# Patient Record
Sex: FEMALE | Race: WHITE | NOT HISPANIC OR LATINO | Employment: FULL TIME | URBAN - METROPOLITAN AREA
[De-identification: names, ages, dates, MRNs, and addresses within clinical notes are randomized per-mention and may not be internally consistent; named-entity substitution may affect disease eponyms.]

---

## 2020-12-16 ENCOUNTER — APPOINTMENT (OUTPATIENT)
Dept: CT IMAGING | Facility: CLINIC | Age: 30
End: 2020-12-16
Attending: EMERGENCY MEDICINE
Payer: OTHER MISCELLANEOUS

## 2020-12-16 ENCOUNTER — HOSPITAL ENCOUNTER (EMERGENCY)
Facility: CLINIC | Age: 30
Discharge: HOME | End: 2020-12-16
Attending: EMERGENCY MEDICINE | Admitting: EMERGENCY MEDICINE
Payer: OTHER MISCELLANEOUS

## 2020-12-16 VITALS
DIASTOLIC BLOOD PRESSURE: 86 MMHG | HEART RATE: 84 BPM | SYSTOLIC BLOOD PRESSURE: 130 MMHG | OXYGEN SATURATION: 100 % | TEMPERATURE: 98.4 F | RESPIRATION RATE: 16 BRPM

## 2020-12-16 DIAGNOSIS — S00.83XA CONTUSION OF FACE, INITIAL ENCOUNTER: ICD-10-CM

## 2020-12-16 DIAGNOSIS — S06.0X0A CONCUSSION WITHOUT LOSS OF CONSCIOUSNESS, INITIAL ENCOUNTER: ICD-10-CM
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
## 2020-12-16 LAB
HCG UR QL: NEGATIVE
INTERNAL QC OK POCT: YES

## 2020-12-16 PROCEDURE — 99284 EMERGENCY DEPT VISIT MOD MDM: CPT | Performed by: EMERGENCY MEDICINE

## 2020-12-16 PROCEDURE — 81025 URINE PREGNANCY TEST: CPT | Performed by: EMERGENCY MEDICINE

## 2020-12-16 PROCEDURE — 70486 CT MAXILLOFACIAL W/O DYE: CPT

## 2020-12-16 PROCEDURE — 70450 CT HEAD/BRAIN W/O DYE: CPT

## 2020-12-16 PROCEDURE — 99285 EMERGENCY DEPT VISIT HI MDM: CPT | Mod: 25 | Performed by: EMERGENCY MEDICINE

## 2020-12-16 NOTE — ED NOTES
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

## 2020-12-16 NOTE — ED TRIAGE NOTES
Pt was working with a horse yesterday and was kicked in the head. Pt states she was grazed on the left side by the eye. Pt states she has been feeling foggy, harder making decisions, and more emotional today.

## 2020-12-16 NOTE — ED PROVIDER NOTES
"ED Provider Note  [facility]      History     Chief Complaint   Patient presents with     Head Injury     Pt was kicked near left eye by horse     The history is provided by the patient.     Audra Villa is a 30 year old otherwise healthy female who presents to the Emergency Department for evaluation following being kicked by a horse. Patient reports yesterday around 1745 she was trying to help her employer who had been trapped under a horse that fell down. She states at this time the horse kicked her just lateral to her left eye. Patient reports she initially felt fine, after about 30 minutes she began to develop pain but it was tolerable with ibuprofen. Patient reports today she went to work and felt \"off\" overall. She states her pain increased from 3/10 to 5/10. The pain begins lateral to her left eye and radiates into the left occipital area. She states she also felt more emotional and was crying easily. Patient is also more fatigued. Patient denies loss of consciousness. No change in vision or dizziness. No other symptoms noted.    Past Medical History  History reviewed. No pertinent past medical history.  History reviewed. No pertinent surgical history.  No current outpatient medications on file.    No Known Allergies  Family History  History reviewed. No pertinent family history.  Social History   Social History     Tobacco Use     Smoking status: Never Smoker     Smokeless tobacco: Never Used   Substance Use Topics     Alcohol use: Not Currently     Drug use: Never      Past medical history, past surgical history, medications, allergies, family history, and social history were reviewed with the patient. No additional pertinent items.       Review of Systems  A complete review of systems was performed with pertinent positives and negatives noted in the HPI, and all other systems negative.    Physical Exam   BP: (!) 153/86  Pulse: 111  Temp: 98.4  F (36.9  C)  Resp: 16  SpO2: 97 " %  Physical Exam  Constitutional:       General: She is not in acute distress.     Appearance: She is well-developed. She is not diaphoretic.   HENT:      Head: Normocephalic. Abrasion present. No raccoon eyes, Cordova's sign, right periorbital erythema or left periorbital erythema.      Jaw: No malocclusion.        Mouth/Throat:      Pharynx: No oropharyngeal exudate.   Eyes:      General: No scleral icterus.        Right eye: No discharge.         Left eye: No discharge.      Extraocular Movements: Extraocular movements intact.      Conjunctiva/sclera: Conjunctivae normal.      Pupils: Pupils are equal, round, and reactive to light.   Neck:      Musculoskeletal: Normal range of motion and neck supple. No spinous process tenderness or muscular tenderness.   Cardiovascular:      Rate and Rhythm: Normal rate and regular rhythm.      Heart sounds: Normal heart sounds. No murmur. No friction rub. No gallop.    Pulmonary:      Effort: Pulmonary effort is normal. No respiratory distress.      Breath sounds: Normal breath sounds. No wheezing.   Chest:      Chest wall: No tenderness.   Abdominal:      General: Bowel sounds are normal. There is no distension.      Palpations: Abdomen is soft.      Tenderness: There is no abdominal tenderness.   Musculoskeletal: Normal range of motion.         General: No tenderness or deformity.   Skin:     General: Skin is warm and dry.      Coloration: Skin is not pale.      Findings: No erythema or rash.   Neurological:      General: No focal deficit present.      Mental Status: She is alert and oriented to person, place, and time.      Cranial Nerves: No cranial nerve deficit.      Sensory: No sensory deficit.      Motor: No weakness.      Coordination: Coordination normal.         ED Course      Procedures                         No results found for any visits on 12/16/20.  Medications - No data to display     Assessments & Plan (with Medical Decision Making)   This is a 30-year-old  female who presents after being kicked by a horse.  This occurred yesterday.  There is no LOC.  Patient has been having worsening headache since this time.  She also notes that she is not feeling herself and is more emotional than usual.  On exam patient has an abrasion to her left eye.  Eyes are normal with no conjunctival hemorrhage or problems with extraocular movements.  Has tenderness at the site.  No cervical midline tenderness.  No malocclusion.  CT head shows no acute abnormalities.  CT facial bones shows no acute abnormalities.  Patient symptoms appear to be consistent with concussion.  I discussed concussion care and provided instructions regarding this.  We will provide a referral for concussion clinic as needed. Will discharge home with return precautions. Discussed reasons to return to the emergency department.  Patient understands and agrees with this plan.    I have reviewed the nursing notes. I have reviewed the findings, diagnosis, plan and need for follow up with the patient.    New Prescriptions    No medications on file       Final diagnoses:   None     Argelia VALERIO, am serving as a trained medical scribe to document services personally performed by Hakan Aburto DO, based on the provider's statements to me.      Hakan VALERIO DO, was physically present and have reviewed and verified the accuracy of this note documented by Argelia Marie.    --  Hakan Aburto DO  [facility] EMERGENCY DEPARTMENT  12/16/2020     Hakan Aburto DO  12/16/20 2017

## 2020-12-17 NOTE — DISCHARGE INSTRUCTIONS
Please make an appointment to follow up with Sports Medicine (phone: [phone]) in 14 days as needed.    Return to the emergency department if symptoms get worse, there are any new symptoms or any cause for concern.

## 2021-03-16 ENCOUNTER — IMMUNIZATION (OUTPATIENT)
Dept: NURSING | Facility: CLINIC | Age: 31
End: 2021-03-16
Payer: COMMERCIAL

## 2021-03-16 PROCEDURE — 0001A PR COVID VAC PFIZER DIL RECON 30 MCG/0.3 ML IM: CPT

## 2021-03-16 PROCEDURE — 91300 PR COVID VAC PFIZER DIL RECON 30 MCG/0.3 ML IM: CPT

## 2021-04-06 ENCOUNTER — IMMUNIZATION (OUTPATIENT)
Dept: NURSING | Facility: CLINIC | Age: 31
End: 2021-04-06
Attending: INTERNAL MEDICINE
Payer: COMMERCIAL

## 2021-04-06 PROCEDURE — 0002A PR COVID VAC PFIZER DIL RECON 30 MCG/0.3 ML IM: CPT

## 2021-04-06 PROCEDURE — 91300 PR COVID VAC PFIZER DIL RECON 30 MCG/0.3 ML IM: CPT

## 2021-04-18 ENCOUNTER — APPOINTMENT (OUTPATIENT)
Dept: GENERAL RADIOLOGY | Facility: CLINIC | Age: 31
End: 2021-04-18
Attending: EMERGENCY MEDICINE
Payer: OTHER MISCELLANEOUS

## 2021-04-18 ENCOUNTER — HOSPITAL ENCOUNTER (EMERGENCY)
Facility: CLINIC | Age: 31
Discharge: HOME | End: 2021-04-18
Attending: EMERGENCY MEDICINE | Admitting: EMERGENCY MEDICINE
Payer: OTHER MISCELLANEOUS

## 2021-04-18 VITALS
SYSTOLIC BLOOD PRESSURE: 136 MMHG | OXYGEN SATURATION: 99 % | HEART RATE: 87 BPM | RESPIRATION RATE: 16 BRPM | TEMPERATURE: 97.7 F | DIASTOLIC BLOOD PRESSURE: 86 MMHG

## 2021-04-18 DIAGNOSIS — S90.32XA CONTUSION OF LEFT FOOT, INITIAL ENCOUNTER: ICD-10-CM

## 2021-04-18 PROCEDURE — 73630 X-RAY EXAM OF FOOT: CPT | Mod: LT

## 2021-04-18 PROCEDURE — 99283 EMERGENCY DEPT VISIT LOW MDM: CPT | Performed by: EMERGENCY MEDICINE

## 2021-04-18 PROCEDURE — 250N000013 HC RX MED GY IP 250 OP 250 PS 637: Performed by: EMERGENCY MEDICINE

## 2021-04-18 RX ORDER — ACETAMINOPHEN 500 MG
1000 TABLET ORAL ONCE
Status: COMPLETED | OUTPATIENT
Start: 2021-04-18 | End: 2021-04-18

## 2021-04-18 RX ADMIN — ACETAMINOPHEN 1000 MG: 500 TABLET, FILM COATED ORAL at 10:56

## 2021-04-18 ASSESSMENT — ENCOUNTER SYMPTOMS
DIFFICULTY URINATING: 0
FEVER: 0
NECK STIFFNESS: 0
HEADACHES: 0
EYE REDNESS: 0
CONFUSION: 0
ABDOMINAL PAIN: 0
COLOR CHANGE: 0
ARTHRALGIAS: 0
SHORTNESS OF BREATH: 0

## 2021-04-18 NOTE — ED TRIAGE NOTES
PT works at vet clinic. Sedated horse started to stand and put full weight onto pts foot as it was attempting to get up.

## 2021-04-18 NOTE — ED PROVIDER NOTES
[facility] EMERGENCY DEPARTMENT ([facility])    4/18/21      History     Chief Complaint   Patient presents with     Foot Injury     Pt stepped on by a horse     The history is provided by the patient and medical records.   Foot Injury  Associated symptoms: no fever      Audra Villa is a 30 year old female who presents to the Emergency Department for evaluation of left foot pain after a crush injury done by a horse. Patient is a Vet, and was preparing a sedation protocol on the horse when the horse lost its balance and stopped down hard onto the patient's left foot. She reports she was able to bear weight on the foot immediately afterward. She states she does have some slight tingling into the toes, but is able to wiggle her toes. She states she wasn't wearing steel-toed boots at the time of the injury.       I have reviewed the Medications, Allergies, Past Medical and Surgical History, and Social History in the [facility] system.  PAST MEDICAL HISTORY: History reviewed. No pertinent past medical history.    PAST SURGICAL HISTORY: History reviewed. No pertinent surgical history.    Past medical history, past surgical history, medications, and allergies were reviewed with the patient. Additional pertinent items: None    FAMILY HISTORY: History reviewed. No pertinent family history.    SOCIAL HISTORY:   Social History     Tobacco Use     Smoking status: Never Smoker     Smokeless tobacco: Never Used   Substance Use Topics     Alcohol use: Not Currently     Social history was reviewed with the patient. Additional pertinent items: None      Patient's Medications    No medications on file        No Known Allergies     Review of Systems   Constitutional: Negative for fever.   HENT: Negative for congestion.    Eyes: Negative for redness.   Respiratory: Negative for shortness of breath.    Cardiovascular: Negative for chest pain.   Gastrointestinal: Negative for abdominal pain.   Genitourinary: Negative for difficulty  urinating.   Musculoskeletal: Negative for arthralgias and neck stiffness.        L-foot pain   Skin: Negative for color change.   Neurological: Negative for headaches.   Psychiatric/Behavioral: Negative for confusion.   All other systems reviewed and are negative.      Physical Exam   BP: (!) 148/93  Pulse: 92  Temp: 97.7  F (36.5  C)  Resp: 18  SpO2: 99 %      Physical Exam  Vitals signs and nursing note reviewed.   Constitutional:       General: She is not in acute distress.     Appearance: She is well-developed. She is not ill-appearing or toxic-appearing.   HENT:      Head: Normocephalic and atraumatic.   Eyes:      General: No scleral icterus.     Pupils: Pupils are equal, round, and reactive to light.   Neck:      Musculoskeletal: Normal range of motion and neck supple.   Cardiovascular:      Rate and Rhythm: Normal rate.   Pulmonary:      Effort: Pulmonary effort is normal. No respiratory distress.   Musculoskeletal:      Left foot: Tenderness, bony tenderness and swelling present. No crepitus, deformity or laceration.        Feet:       Comments: Sensation is intact distally in toes.  Patient able to flex and extend toes, but quite painful to do so.   Skin:     General: Skin is warm and dry.      Coloration: Skin is not pale.      Findings: No rash.   Neurological:      Mental Status: She is alert and oriented to person, place, and time.      Sensory: No sensory deficit.   Psychiatric:         Behavior: Behavior normal.         ED Course   10:40 AM  The patient was seen and examined by Wale Perez MD in Room ED02.        Procedures               Results for orders placed or performed during the hospital encounter of 04/18/21 (from the past 24 hour(s))   XR Foot Left 3 Views    Narrative    LEFT FOOT THREE OR MORE VIEWS  4/18/2021 11:25 AM    HISTORY: Stepped on by a horse.    COMPARISON: None available      Impression    IMPRESSION:  Moderate dorsal left distal foot soft tissue swelling. No  acute left  foot fracture or dislocation identified. No significant  joint space narrowing. Accessory os navicularis. Dorsal spurring  proximal navicular.     HAILEY PINEDA MD     Medications   acetaminophen (TYLENOL) tablet 1,000 mg (1,000 mg Oral Given 4/18/21 1056)             Assessments & Plan (with Medical Decision Making)   As patient presented to the emergency department after injuring her left foot when a horse stepped on her foot as it was pushing off to jump.  She is neurovascularly intact and injury appears to be localized more to the forefoot area.  No significant tenderness to plantar aspect.  Arch feels intact.  No tenderness overlying Lisfranc joint.  X-ray was obtained.    X-ray demonstrated no evidence of fractures or dislocations.  Suspect significant contusion of foot.  Nothing on exam currently suggests compartment syndrome.  I have recommended patient follow-up in the orthopedic clinic for reevaluation and stay off of the foot by using crutches for the next few days.  Recommended weightbearing as tolerated with postop shoe after this.  Stressed ice and elevation.  Patient discharged in good condition.    I have reviewed the nursing notes.    I have reviewed the findings, diagnosis, plan and need for follow up with the patient.    New Prescriptions    No medications on file       Final diagnoses:   Contusion of left foot, initial encounter   I, Shaun Brown, am serving as a trained medical scribe to document services personally performed by Cali Perez MD, based on the provider's statements to me.      ICali MD, was physically present and have reviewed and verified the accuracy of this note documented by Shaun Brown.     4/18/2021   [facility] EMERGENCY DEPARTMENT     Cali Perez MD  04/18/21 3108

## 2021-04-18 NOTE — DISCHARGE INSTRUCTIONS
Please make an appointment to follow up with Orthopedics Clinic (phone: [phone]), they should call you early this week to set up an appointment.  If you do not hear from them by Wednesday call them.    Crutches for ambulation.  Begin to bear weight in 1 to 2 days as tolerated using postop shoe.    Ice and elevation.    Tylenol and/or ibuprofen for pain.    Return to the emergency department for any problems.

## 2021-04-19 ENCOUNTER — TELEPHONE (OUTPATIENT)
Dept: ORTHOPEDICS | Facility: CLINIC | Age: 31
End: 2021-04-19

## 2021-04-19 NOTE — TELEPHONE ENCOUNTER
DIAGNOSIS: Horse stepped on foot   APPOINTMENT DATE: 4.21.21   NOTES STATUS DETAILS   OFFICE NOTE from referring provider N/A    OFFICE NOTE from other specialist N/A    DISCHARGE SUMMARY from hospital N/A    DISCHARGE REPORT from the ER Internal 4.18.21 [facility] ED   OPERATIVE REPORT N/A    EMG report N/A    MEDICATION LIST Internal    MRI N/A    DEXA (osteoporosis/bone health) N/A    CT SCAN N/A    XRAYS (IMAGES & REPORTS) Internal 4.18.21 L foot

## 2021-04-19 NOTE — TELEPHONE ENCOUNTER
Ref Type Non-Surgical    Specialty Foot and Ankle    When Within 1 Week    FU Reason Horse stepped on foot      Called and spoke to pt. Schedule pt for first available appt. Pt confirmed appt date/time/location. All questions answered.     Yoli Braga ATC

## 2021-04-21 ENCOUNTER — PRE VISIT (OUTPATIENT)
Dept: ORTHOPEDICS | Facility: CLINIC | Age: 31
End: 2021-04-21

## 2021-04-21 ENCOUNTER — OFFICE VISIT (OUTPATIENT)
Dept: ORTHOPEDICS | Facility: CLINIC | Age: 31
End: 2021-04-21
Payer: COMMERCIAL

## 2021-04-21 VITALS — HEIGHT: 66 IN | WEIGHT: 165 LBS | BODY MASS INDEX: 26.52 KG/M2

## 2021-04-21 DIAGNOSIS — M79.672 LEFT FOOT PAIN: Primary | ICD-10-CM

## 2021-04-21 PROCEDURE — 99203 OFFICE O/P NEW LOW 30 MIN: CPT | Performed by: FAMILY MEDICINE

## 2021-04-21 RX ORDER — DEXTROAMPHETAMINE/AMPHETAMINE 15 MG
CAPSULE, EXT RELEASE 24 HR ORAL
COMMUNITY
Start: 2021-03-26

## 2021-04-21 RX ORDER — ESCITALOPRAM OXALATE 10 MG/1
TABLET ORAL
COMMUNITY
Start: 2021-04-13 | End: 2022-06-22

## 2021-04-21 ASSESSMENT — MIFFLIN-ST. JEOR: SCORE: 1495.19

## 2021-04-21 NOTE — PROGRESS NOTES
" Subjective:   Audra Villa is a 30 year old female who presents with left forefoot pain.  Horse was sedated to check eye.  Weight into front foot, braced before falling.  Weight and force into patient foot.  Doesn't wear steel capped shoes.  Rest at home, foot up, on call for 3 nights.  7 p.m. to 5 a.m. so not icing and resting as much.  Presented to the ER and reports that she has a high pain threshold.  Patient typically would not go in but this is a substantial amount of weight from the horse  Background:   Date of injury: 4/18/2021  Duration of symptoms: 3 days  Mechanism of Injury: Acute- horse stepped on her when he fell over  Aggravating factors: WB, flexing toes  Relieving Factors: Advil, tylenol, post-op shoe, crutches, elevation  Prior Evaluation: ED, xrays    PAST MEDICAL, SOCIAL, SURGICAL AND FAMILY HISTORY: She  has no past medical history on file.  She  has no past surgical history on file.  Her family history is not on file.  She reports that she has never smoked. She has never used smokeless tobacco. She reports previous alcohol use. She reports that she does not use drugs.    ALLERGIES: She has No Known Allergies.    CURRENT MEDICATIONS: She has a current medication list which includes the following prescription(s): adderall xr and escitalopram.     REVIEW OF SYSTEMS: 10 point review of systems is negative except as noted above.     Exam:   Ht 1.676 m (5' 6\")   Wt 74.8 kg (165 lb)   BMI 26.63 kg/m             CONSTITUTIONAL: healthy, alert, moderate distress and cooperative  HEAD: Normocephalic. No masses, lesions, tenderness or abnormalities  SKIN: no suspicious lesions or rashes, very ecchymotic, bruised between 1st and 2nd toes and pain across MTPs  GAIT: crutches and postop shoe  NEUROLOGIC: Non-focal  PSYCHIATRIC: affect normal/bright and mentation appears normal.    MUSCULOSKELETAL: left foot pain    ANKLE  Inspection/Palpation:     Swelling: no swelling      Non-tender: ATFL, CFL, PTFL, " medial malleolus, deltoid ligament, anterior tib-fib ligament, achilles  tendon, no achilles tendon defect   Range of Motion: dorsiflexion: full, plantarflexion: full, inversion: full, eversion: full  Strength:dorsiflexion: 5/5, plantarflexion: 5/5, inversion: 5/5, eversion: 5/5   Special tests: negative anterior drawer, negative varus stress, negative valgus stress, negative forced external rotation, negative Dey sign     FOOT  Inspection/Palpation:      Swelling: ++swelling  Tender: dorsal foot, between 1st and 2nd toe, 1-3 MTP     Non-tender: proximal 5th metatarsal, 4th, 5th metatarsals, calcaneous, cuboid,  navicular, cuneiform lateral, cuneiform middle, cuneiform medial, metatarsal heads, peroneal tendon: at lateral malleolus, at cuboid, at proximal 5th metatarsal, posterior tibial tendon at medial malleolus, posterior tibial tendon at navicular, plantar fascia  Range of Motion: flexion of toes: full, extension of toes: full         Assessment/Plan:   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  Left foot injury-patient was stepped on by horse and has significant ecchymoses and tenderness across the dorsal foot  Patient reports that she needs to get back to work and is currently using crutches and is able to get her foot into her boot  Strongly encouraged the patient to ice and elevate trying to decrease the amount of inflammation and edema that is present  This is a work comp situation and has been filed and I am aware that there may be paperwork required.  At this point the patient reports that she does not have any  MRI suggested to rule out fracture and possible Lisfranc injury  Patient will continue crutches, icing, elevation, Tylenol for pain as needed, and start on range of motion  Patient will be scheduled for MRI and treatment will change depending on findings    30 min- reviewed chart,  imaging, charting and plan coordination  X-RAY INTERPRETATION:   Reviewed- normal

## 2021-04-21 NOTE — LETTER
Date:April 22, 2021      Patient was self referred, no letter generated. Do not send.        [facility] Health Information

## 2021-04-21 NOTE — LETTER
"  4/21/2021      RE: Audra Villa  [address] [place] [address]        Subjective:   Audra Villa is a 30 year old female who presents with left forefoot pain.  Horse was sedated to check eye.  Weight into front foot, braced before falling.  Weight and force into patient foot.  Doesn't wear steel capped shoes.  Rest at home, foot up, on call for 3 nights.  7 p.m. to 5 a.m. so not icing and resting as much.  Presented to the ER and reports that she has a high pain threshold.  Patient typically would not go in but this is a substantial amount of weight from the horse  Background:   Date of injury: 4/18/2021  Duration of symptoms: 3 days  Mechanism of Injury: Acute- horse stepped on her when he fell over  Aggravating factors: WB, flexing toes  Relieving Factors: Advil, tylenol, post-op shoe, crutches, elevation  Prior Evaluation: ED, xrays    PAST MEDICAL, SOCIAL, SURGICAL AND FAMILY HISTORY: She  has no past medical history on file.  She  has no past surgical history on file.  Her family history is not on file.  She reports that she has never smoked. She has never used smokeless tobacco. She reports previous alcohol use. She reports that she does not use drugs.    ALLERGIES: She has No Known Allergies.    CURRENT MEDICATIONS: She has a current medication list which includes the following prescription(s): adderall xr and escitalopram.     REVIEW OF SYSTEMS: 10 point review of systems is negative except as noted above.     Exam:   Ht 1.676 m (5' 6\")   Wt 74.8 kg (165 lb)   BMI 26.63 kg/m             CONSTITUTIONAL: healthy, alert, moderate distress and cooperative  HEAD: Normocephalic. No masses, lesions, tenderness or abnormalities  SKIN: no suspicious lesions or rashes, very ecchymotic, bruised between 1st and 2nd toes and pain across MTPs  GAIT: crutches and postop shoe  NEUROLOGIC: Non-focal  PSYCHIATRIC: affect normal/bright and mentation appears normal.    MUSCULOSKELETAL: left foot " pain    ANKLE  Inspection/Palpation:     Swelling: no swelling      Non-tender: ATFL, CFL, PTFL, medial malleolus, deltoid ligament, anterior tib-fib ligament, achilles  tendon, no achilles tendon defect   Range of Motion: dorsiflexion: full, plantarflexion: full, inversion: full, eversion: full  Strength:dorsiflexion: 5/5, plantarflexion: 5/5, inversion: 5/5, eversion: 5/5   Special tests: negative anterior drawer, negative varus stress, negative valgus stress, negative forced external rotation, negative Dey sign     FOOT  Inspection/Palpation:      Swelling: ++swelling  Tender: dorsal foot, between 1st and 2nd toe, 1-3 MTP     Non-tender: proximal 5th metatarsal, 4th, 5th metatarsals, calcaneous, cuboid,  navicular, cuneiform lateral, cuneiform middle, cuneiform medial, metatarsal heads, peroneal tendon: at lateral malleolus, at cuboid, at proximal 5th metatarsal, posterior tibial tendon at medial malleolus, posterior tibial tendon at navicular, plantar fascia  Range of Motion: flexion of toes: full, extension of toes: full         Assessment/Plan:   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  Left foot injury-patient was stepped on by horse and has significant ecchymoses and tenderness across the dorsal foot  Patient reports that she needs to get back to work and is currently using crutches and is able to get her foot into her boot  Strongly encouraged the patient to ice and elevate trying to decrease the amount of inflammation and edema that is present  This is a work comp situation and has been filed and I am aware that there may be paperwork required.  At this point the patient reports that she does not have any  MRI suggested to rule out fracture and possible Lisfranc injury  Patient will continue crutches, icing, elevation, Tylenol for pain as needed, and start on range of motion  Patient will be  scheduled for MRI and treatment will change depending on findings    30 min- reviewed chart, imaging, charting and plan coordination  X-RAY INTERPRETATION:   Reviewed- normal      Marisol Cooper MD

## 2021-04-29 ENCOUNTER — HOSPITAL ENCOUNTER (OUTPATIENT)
Dept: MRI IMAGING | Facility: CLINIC | Age: 31
Discharge: HOME | End: 2021-04-29
Attending: FAMILY MEDICINE | Admitting: FAMILY MEDICINE
Payer: OTHER MISCELLANEOUS

## 2021-04-29 DIAGNOSIS — M79.672 LEFT FOOT PAIN: ICD-10-CM

## 2021-04-29 PROCEDURE — 73718 MRI LOWER EXTREMITY W/O DYE: CPT | Mod: 26 | Performed by: RADIOLOGY

## 2021-04-29 PROCEDURE — 73718 MRI LOWER EXTREMITY W/O DYE: CPT | Mod: LT

## 2021-05-01 ENCOUNTER — HEALTH MAINTENANCE LETTER (OUTPATIENT)
Age: 31
End: 2021-05-01

## 2021-05-25 ENCOUNTER — OFFICE VISIT - HEALTHEAST (OUTPATIENT)
Dept: FAMILY MEDICINE | Facility: CLINIC | Age: 31
End: 2021-05-25

## 2021-05-25 DIAGNOSIS — Z00.00 ANNUAL PHYSICAL EXAM: ICD-10-CM

## 2021-05-25 DIAGNOSIS — Z97.5 NEXPLANON IN PLACE: ICD-10-CM

## 2021-05-25 DIAGNOSIS — Z12.4 SCREENING FOR MALIGNANT NEOPLASM OF CERVIX: ICD-10-CM

## 2021-05-25 DIAGNOSIS — F41.9 ANXIETY: ICD-10-CM

## 2021-05-25 ASSESSMENT — MIFFLIN-ST. JEOR: SCORE: 1467.98

## 2021-06-01 ENCOUNTER — COMMUNICATION - HEALTHEAST (OUTPATIENT)
Dept: FAMILY MEDICINE | Facility: CLINIC | Age: 31
End: 2021-06-01

## 2021-06-16 PROBLEM — F41.9 ANXIETY: Status: ACTIVE | Noted: 2021-05-25

## 2021-06-16 PROBLEM — Z97.5 NEXPLANON IN PLACE: Status: ACTIVE | Noted: 2021-05-25

## 2021-06-30 NOTE — PROGRESS NOTES
Progress Notes by Horacio Vazquez MD at 5/25/2021  7:00 AM     Author: Horacio Vazquez MD Service: -- Author Type: Physician    Filed: 5/25/2021  7:40 PM Encounter Date: 5/25/2021 Status: Signed    : Horacio Vazquez MD (Physician)       FEMALE PREVENTATIVE EXAM    Assessment and Plan:     Patient has been advised of split billing requirements and indicates understanding: Yes    Annual physical exam  Encounter to establish care.  This is a 30-year-old female, originally from [place], [place].  She is here to establish care because she is now going to be here longer than just a year.  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    Screening for malignant neoplasm of cervix  Last Pap smears 3 to 6 years ago, she will do one today.  - Gynecologic Cytology (PAP Smear)    Nexplanon in place  Patient has random spotting, this is her third Nexplanon.  She has no complaints.  She does want another one when this 1 expires next year.  If she does have breakthrough bleeding, we can discuss treatment options.  We did not discuss that today.    Anxiety  ADHD  Follows with psychiatry, stable on Lexapro 15 mg daily for anxiety, Adderall 15 mg daily for ADHD.        Next follow up:  Return in about 1 year (around 5/25/2022) for Annual physical.    Immunization Review  Adult Imm Review: No immunizations due today    I discussed the following with the patient:   Adult Healthy Living: Importance of regular exercise  Getting adequate sleep  Stress management    I have had an Advance Directives discussion with the patient.    Subjective:   Chief Complaint: Audra Villa is an 30 y.o. female here for a preventative health visit.    Patient has been advised of split billing requirements and indicates understanding: Yes  HPI:      From [place], [place].   Works as a vet.   Did her [place], born [place].  Significant  other is still overseas.  She is try to get them over here in the visit but it has been difficult during Covid.  She has not been sexually active in well over a year, declines any STD screening.  She is on the Nexplanon, this is her third 1 and would like another one when this 1 expires in a year.  Moved here in may 2021. Signed a 3 year contract.   nexplanon placed 6/25/2019, 3rd one placed. Due for removal 6/26/2022    Sees psych for adhd and anxiety.     Dad's dad had lung and liver cancer but smoking and drinking.   Dad's mom - brain tumor  Mom's dad - maybe an AVM  gma and mom - anxiety or depression    Left knee meniscus surgery.   Rhinoplasty - reconstructive surgery.   Branchial cyst - complete s/p removal.     Healthy Habits  Are you taking a daily aspirin? No  Do you typically exercising at least 40 min, 3-4 times per week?  Yes  Do you usually eat at least 4 servings of fruit and vegetables a day, include whole grains and fiber and avoid regularly eating high fat foods? NO  Have you had an eye exam in the past two years? NO  Do you see a dentist twice per year? Yes  Do you have any concerns regarding sleep? No    Safety Screen  If you own firearms, are they secured in a locked gun cabinet or with trigger locks? Yes  Do you feel you are safe where you are living?: Yes (5/25/2021  6:58 AM)  Do you feel you are safe in your relationship(s)?: Yes (5/25/2021  6:58 AM)      Review of Systems:  Please see above.  The rest of the review of systems are negative for all systems.       Cancer Screening       Status Date      PAP SMEAR Overdue 11/25/2013           Patient Care Team:  Horacio Vazquez MD as PCP - General (Family Medicine)        History     Reviewed By Date/Time Sections Reviewed    Horacio Vazquez MD 5/25/2021  7:05 AM Medical, Surgical, Tobacco, Family    Kassie Barbour CMA 5/25/2021  6:59 AM Tobacco, Alcohol, Drug Use            Objective:   Vital Signs:   Visit Vitals  /72   Pulse 92  "  Temp 98  F (36.7  C) (Oral)   Resp 16   Ht 5' 5\" (1.651 m)   Wt 162 lb 8 oz (73.7 kg)   LMP 04/27/2021 (Exact Date)   SpO2 99%   Breastfeeding No   BMI 27.04 kg/m           PHYSICAL EXAM  General: Alert and oriented, in NAD.  Eyes: PERRL, EOMI, sclera normal.  HENT: Normocephalic, no pharyngeal erythema, MMM.  Neck: Supple, no adenopathy.  Heart: Normal S1 and S2, regular rhythm. No murmurs, gallops, rubs.  Lungs: CTA bilaterally, no wheezes, no crackles, no rhonchi.  Abdomen: Soft, non-tender, non-distended, BS+.   MSK: Normal ROM of upper and lower extremities.  Neuro: Alert and oriented x 3. Moves all extremities. No focal deficits noted.  Extremities: Peripheral pulses intact, no peripheral edema.  Skin: Warm and well perfused, no rashes noted.  Psych: Normal mood and affect.         Medication List          Accurate as of May 25, 2021  7:36 PM. If you have any questions, ask your nurse or doctor.            CONTINUE taking these medications    Adderall XR 15 MG 24 hr capsule  INSTRUCTIONS: TAKE 1 CAPSULE BY MOUTH DAILY IN THE MORNING  Generic drug: dextroamphetamine-amphetamine        * escitalopram oxalate 10 MG tablet  Also known as: LEXAPRO        * escitalopram oxalate 5 MG tablet  Also known as: LEXAPRO            * This list has 2 medication(s) that are the same as other medications prescribed for you. Read the directions carefully, and ask your doctor or other care provider to review them with you.                Additional Screenings Completed Today:   See assessment/plan    ==================================      Options for treatment and follow-up care were reviewed with the patient. Audra BAKARI Barbosas and/or guardian was engaged and actively involved in the decision making process. Audra BAKARI Barbosas and/or guardian verbalized understanding of the options discussed and was satisfied with the final plan.    Horacio Vazquez MD         "

## 2021-07-04 NOTE — LETTER
Letter by Jazzmine Puente RN at      Author: Jazzmine Puente RN Service: -- Author Type: --    Filed:  Encounter Date: 6/1/2021 Status: (Other)         Audra Villa  [address] [place] [address]             June 1, 2021         Dear Ms. Jacobsenlis,    We are happy to inform you that your recent Pap smear is normal.    It is recommended that you have your next Pap smear in 3 years. You will also need to return to the clinic every year for an annual wellness visit.    If you have additional questions regarding this result, please contact our office and we will be happy to assist you.      Sincerely,    Your [facility] Team

## 2021-07-06 VITALS
WEIGHT: 162.5 LBS | HEART RATE: 92 BPM | RESPIRATION RATE: 16 BRPM | BODY MASS INDEX: 27.07 KG/M2 | DIASTOLIC BLOOD PRESSURE: 72 MMHG | TEMPERATURE: 98 F | HEIGHT: 65 IN | OXYGEN SATURATION: 99 % | SYSTOLIC BLOOD PRESSURE: 110 MMHG

## 2021-10-11 ENCOUNTER — HEALTH MAINTENANCE LETTER (OUTPATIENT)
Age: 31
End: 2021-10-11

## 2021-11-20 ENCOUNTER — DOCUMENTATION ONLY (OUTPATIENT)
Dept: OTHER | Facility: CLINIC | Age: 31
End: 2021-11-20
Payer: COMMERCIAL

## 2022-06-22 ENCOUNTER — OFFICE VISIT (OUTPATIENT)
Dept: MIDWIFE SERVICES | Facility: CLINIC | Age: 32
End: 2022-06-22
Payer: COMMERCIAL

## 2022-06-22 VITALS — SYSTOLIC BLOOD PRESSURE: 136 MMHG | DIASTOLIC BLOOD PRESSURE: 82 MMHG | WEIGHT: 181 LBS | BODY MASS INDEX: 30.12 KG/M2

## 2022-06-22 DIAGNOSIS — Z30.46 SURVEILLANCE OF PREVIOUSLY PRESCRIBED IMPLANTABLE SUBDERMAL CONTRACEPTIVE: Primary | ICD-10-CM

## 2022-06-22 PROBLEM — Z97.5 NEXPLANON IN PLACE: Status: RESOLVED | Noted: 2021-05-25 | Resolved: 2022-06-22

## 2022-06-22 PROCEDURE — 11983 REMOVE/INSERT DRUG IMPLANT: CPT

## 2022-06-22 RX ORDER — ESCITALOPRAM OXALATE 10 MG/1
TABLET ORAL
COMMUNITY
Start: 2021-04-01

## 2022-06-22 RX ORDER — ESCITALOPRAM OXALATE 5 MG/1
TABLET ORAL
COMMUNITY
Start: 2022-04-27 | End: 2022-06-22

## 2022-06-22 RX ORDER — ESCITALOPRAM OXALATE 5 MG/1
TABLET ORAL
COMMUNITY
Start: 2021-04-30

## 2022-06-22 NOTE — NURSING NOTE
"Chief Complaint   Patient presents with     Contraception     Here for Nexplanon replacement. Inserted 2019       Initial /82 (BP Location: Right arm, Cuff Size: Adult Regular)   Wt 82.1 kg (181 lb)   Breastfeeding No   BMI 30.12 kg/m   Estimated body mass index is 30.12 kg/m  as calculated from the following:    Height as of 21: 1.651 m (5' 5\").    Weight as of this encounter: 82.1 kg (181 lb).  BP completed using cuff size: regular    Questioned patient about current smoking habits.  Pt. has never smoked.          The following HM Due: NONE  "

## 2022-06-22 NOTE — PROGRESS NOTES
NEXPLANON REMOVAL/REINSERTION:    Is a pregnancy test required: No.  Was a consent obtained?  Yes    Subjective: Audra Villa is a 31 year old  presents for Nexplanon.    Patient has been given the opportunity to ask questions about all forms of birth control, including all options appropriate for Audra Villa. Discussed that no method of birth control, except abstinence is 100% effective against pregnancy or sexually transmitted infection.     Audra Villa understands planning removal and reinsertion today    The entire removal and insertion procedure was reviewed with the patient, including care after placement.      /82 (BP Location: Right arm, Cuff Size: Adult Regular)   Wt 82.1 kg (181 lb)   Breastfeeding No   BMI 30.12 kg/m      PROCEDURE NOTE: -- Nexplanon Removal/Insertion    Technique: On the right arm  Skin prep Betadine  Anesthesia 2% lidocaine  Procedure: Patient was placed supine with right arm exposed.  Implant located by palpitation. Balbir was made 8-10 cm above medial epicondyle and a guiding balbir 4 cm above the first.  Arm was prepped with Betadine. Incision point was anesthetized with 2 mL 1% lidocaine.     Small incision (<5mm) was made at distal end of palpable implant, curved hemostat or mosquito forceps was used to isolate the implant and bring it to the incision, the fibrous capsule containing the implant  was incised and the implant was removed intact.    After stretching the skin with thumb and index finger around the insertion site, skin punctured with the tip of the needle inserted at 30 degrees and then lowered to horizontal position. While lifting the skin with the tip of the needle, inserted the needle to its full length. Applicator was then stabilized and the slider was unlocked by pushing it slightly down. Slider moved back completely until it stopped. Applicator was then removed.    Correct placement of the implant was confirmed by palpation in the patient's  arm and visualizing the purple top of the obturator.   Bandage and pressure dressing applied to insertion site.    Lot # [id number]  Exp: 06/28/2024    EBL: minimal    Complications: none    ASSESSMENT:     ICD-10-CM    1. Surveillance of previously prescribed implantable subdermal contraceptive  Z30.46 etonogestrel (NEXPLANON) subdermal implant 68 mg     etonogestrel (NEXPLANON) 68 MG IMPL     REMOVAL AND REINSERTION NEXPLANON        PLAN:    Given 's handouts, including when to have Nexplanon removed, list of danger s/sx, side effects and follow up recommended. Encouraged condom use for prevention of STD. Back up contraception advised for 7 days. Advised to call for any fever, for prolonged or severe pain or bleeding, abnormal vaginal dischage. She was advised to use pain medications (ibuprofen) as needed for mild to moderate pain.     DENI AKINS CNM

## 2022-07-17 ENCOUNTER — HEALTH MAINTENANCE LETTER (OUTPATIENT)
Age: 32
End: 2022-07-17

## 2022-09-12 ENCOUNTER — TELEPHONE (OUTPATIENT)
Dept: FAMILY MEDICINE | Facility: CLINIC | Age: 32
End: 2022-09-12

## 2022-09-12 NOTE — TELEPHONE ENCOUNTER
General Call    Contacts       Type Contact Phone/Fax    09/12/2022 08:49 AM CDT Phone (Incoming) Audra Villa (Self) [phone] (H)        Reason for Call:  Would like to be tested for possible Kidney Donor for Sister    What are your questions or concerns:  Sister lives in another state and was recently diagnosed with Kidney failure. Caller would like to be tested to see if she would be a possible match. Caller is asking if PCP knows what she needs to do or how to get started in finding out if she could be a donor.    Date of last appointment with provider:     Could we send this information to you in [facility] or would you prefer to receive a phone call?:   Patient would prefer a phone call   Okay to leave a detailed message?: Yes at Home number on file [phone] (home)

## 2022-09-24 ENCOUNTER — HEALTH MAINTENANCE LETTER (OUTPATIENT)
Age: 32
End: 2022-09-24

## 2023-01-20 ENCOUNTER — OFFICE VISIT (OUTPATIENT)
Dept: FAMILY MEDICINE | Facility: CLINIC | Age: 33
End: 2023-01-20
Payer: COMMERCIAL

## 2023-01-20 VITALS
OXYGEN SATURATION: 99 % | SYSTOLIC BLOOD PRESSURE: 128 MMHG | HEART RATE: 83 BPM | HEIGHT: 65 IN | DIASTOLIC BLOOD PRESSURE: 80 MMHG | BODY MASS INDEX: 31.06 KG/M2 | WEIGHT: 186.4 LBS | RESPIRATION RATE: 16 BRPM

## 2023-01-20 DIAGNOSIS — D51.0 PERNICIOUS ANEMIA: Primary | ICD-10-CM

## 2023-01-20 DIAGNOSIS — R53.83 OTHER FATIGUE: ICD-10-CM

## 2023-01-20 LAB
ERYTHROCYTE [DISTWIDTH] IN BLOOD BY AUTOMATED COUNT: 12.6 % (ref 10–15)
FOLATE SERPL-MCNC: 8.3 NG/ML (ref 4.6–34.8)
HCT VFR BLD AUTO: 42.9 % (ref 35–47)
HGB BLD-MCNC: 14.5 G/DL (ref 11.7–15.7)
MCH RBC QN AUTO: 31.8 PG (ref 26.5–33)
MCHC RBC AUTO-ENTMCNC: 33.8 G/DL (ref 31.5–36.5)
MCV RBC AUTO: 94 FL (ref 78–100)
PLATELET # BLD AUTO: 270 10E3/UL (ref 150–450)
RBC # BLD AUTO: 4.56 10E6/UL (ref 3.8–5.2)
VIT B12 SERPL-MCNC: 387 PG/ML (ref 232–1245)
WBC # BLD AUTO: 7.8 10E3/UL (ref 4–11)

## 2023-01-20 PROCEDURE — 99213 OFFICE O/P EST LOW 20 MIN: CPT

## 2023-01-20 PROCEDURE — 82746 ASSAY OF FOLIC ACID SERUM: CPT

## 2023-01-20 PROCEDURE — 85027 COMPLETE CBC AUTOMATED: CPT

## 2023-01-20 PROCEDURE — 82607 VITAMIN B-12: CPT

## 2023-01-20 PROCEDURE — 36415 COLL VENOUS BLD VENIPUNCTURE: CPT

## 2023-01-20 ASSESSMENT — PATIENT HEALTH QUESTIONNAIRE - PHQ9
10. IF YOU CHECKED OFF ANY PROBLEMS, HOW DIFFICULT HAVE THESE PROBLEMS MADE IT FOR YOU TO DO YOUR WORK, TAKE CARE OF THINGS AT HOME, OR GET ALONG WITH OTHER PEOPLE: SOMEWHAT DIFFICULT
SUM OF ALL RESPONSES TO PHQ QUESTIONS 1-9: 8
SUM OF ALL RESPONSES TO PHQ QUESTIONS 1-9: 8

## 2023-01-20 NOTE — PROGRESS NOTES
Problem List Items Addressed This Visit    None  Visit Diagnoses     Pernicious anemia    -  Primary    Relevant Orders    CBC with platelets (Completed)    Vitamin B12 (Completed)    Folate (Completed)    Other fatigue        Relevant Orders    CBC with platelets (Completed)    Vitamin B12 (Completed)    Folate (Completed)        Discussed that because symptoms are identical to those patient had experienced at her initial diagnosis of pernicious anemia, I believe it is appropriate to evaluate/treat for B12 deficiency at this time. Unfortunately because this was all done in [place], I don't have access to any of these records. Updated labs were drawn today. However, we discussed that if this workup is negative and/or therapy is not successful, patient should follow up with her PCP to explore other reasons behind her symptoms as fatigue has a wide variety of etiologies. Will await lab results and treat appropriately. Patient expressed an understanding of and agreement with the above plan. All questions were answered.     DENI Rivers FREDERIC Zhu is a 32 year old who presents for the following health issues   Chief Complaint   Patient presents with     Symptoms     Fatigue, inability to eat, sometime feeling nausea when eating certain food, joint pain, lost focus, concern about previous pernicious anemia       Patient reports that the symptoms she is presenting with are identical to symptoms she had approximately ten years ago. At the time, she was living in [place] and after an extensive workup was determined to have pernicious anemia. She was treated with supplementation at that time (three injections) and her symptoms resolved. They have been relatively well controlled since then and she does maintain on a daily B12 supplementation (she believes 500% of daily recommended).     Her primary complaints are extreme fatigue. She is also having some difficulty with food intake and  nausea. She also endorses some numbness and tingling in her fingers intermittently as of just recently which is really what prompted her visit today. She works as an internal medicine resident with large animals and states that this work only exacerbates her symptoms.       History of Present Illness       Reason for visit:  Pernicious anemia  Symptom onset:  1-2 weeks ago  Symptoms include:  Extreme fatigue, tingling in my fingers, inappetence, joint pain, brain fog  Symptom intensity:  Moderate  Symptom progression:  Worsening  Had these symptoms before:  Yes  Has tried/received treatment for these symptoms:  Yes  Previous treatment was successful:  Yes  Prior treatment description:  Vitamin B12 injection  What makes it worse:  The more time I go without treatment  What makes it better:  No    She eats 2-3 servings of fruits and vegetables daily.She consumes 2 sweetened beverage(s) daily.She exercises with enough effort to increase her heart rate 60 or more minutes per day.  She exercises with enough effort to increase her heart rate 5 days per week. She is missing 1 dose(s) of medications per week.  She is not taking prescribed medications regularly due to remembering to take.    Today's PHQ-9         PHQ-9 Total Score: 8    PHQ-9 Q9 Thoughts of better off dead/self-harm past 2 weeks :   Not at all    How difficult have these problems made it for you to do your work, take care of things at home, or get along with other people: Somewhat difficult     Review of Systems   Constitutional: Positive for activity change and fatigue. Negative for chills, fever and unexpected weight change.   Musculoskeletal: Positive for arthralgias and myalgias.   Skin: Negative for pallor.   Neurological: Positive for paresthesias. Negative for dizziness, tremors, weakness and headaches.            Objective    /80 (BP Location: Left arm, Patient Position: Sitting, Cuff Size: Adult Regular)   Pulse 83   Resp 16   Ht 1.651 m  "(5' 5\")   Wt 84.6 kg (186 lb 6.4 oz)   SpO2 99%   BMI 31.02 kg/m    Body mass index is 31.02 kg/m .  Physical Exam  Vitals and nursing note reviewed.   Constitutional:       General: She is not in acute distress.     Appearance: Normal appearance. She is not ill-appearing.   HENT:      Head: Normocephalic and atraumatic.   Cardiovascular:      Rate and Rhythm: Normal rate and regular rhythm.      Pulses: Normal pulses.   Pulmonary:      Effort: Pulmonary effort is normal. No respiratory distress.      Breath sounds: Normal breath sounds.   Musculoskeletal:         General: No swelling or tenderness. Normal range of motion.      Cervical back: Normal range of motion and neck supple.      Comments: 5/5 strength in all extremities   Skin:     General: Skin is warm.      Coloration: Skin is not pale.      Findings: No erythema or rash.   Neurological:      General: No focal deficit present.      Mental Status: She is alert and oriented to person, place, and time.      Cranial Nerves: No cranial nerve deficit.      Sensory: No sensory deficit.      Motor: No weakness.      Coordination: Coordination normal.   Psychiatric:         Mood and Affect: Mood normal.         Behavior: Behavior normal.         Thought Content: Thought content normal.            This note has been dictated using voice recognition software. Any grammatical or context distortions are unintentional and inherent to the software      "

## 2023-01-21 ASSESSMENT — ENCOUNTER SYMPTOMS
FEVER: 0
DIZZINESS: 0
MYALGIAS: 1
ARTHRALGIAS: 1
TREMORS: 0
UNEXPECTED WEIGHT CHANGE: 0
HEADACHES: 0
PARESTHESIAS: 1
ACTIVITY CHANGE: 1
CHILLS: 0
FATIGUE: 1
WEAKNESS: 0

## 2023-04-14 NOTE — ED AVS SNAPSHOT
Magnesium reviewed- Recent Labs   Lab 04/12/23  2346 04/14/23  0423   MG 2.30 1.60*      Will replace magnesium and continue to monitor.          [facility]  [address]  [place] [address]  Phone: [phone]  Fax: [phone]                                    Audra Villa   MRN: [id number]    Department: [facility]   Date of Visit: 12/16/2020           After Visit Summary Signature Page    I have received my discharge instructions, and my questions have been answered. I have discussed any challenges I see with this plan with the nurse or doctor.    ..........................................................................................................................................  Patient/Patient Representative Signature      ..........................................................................................................................................  Patient Representative Print Name and Relationship to Patient    ..................................................               ................................................  Date                                   Time    ..........................................................................................................................................  Reviewed by Signature/Title    ...................................................              ..............................................  Date                                               Time          22EPIC Rev 08/18

## 2023-08-05 ENCOUNTER — HEALTH MAINTENANCE LETTER (OUTPATIENT)
Age: 33
End: 2023-08-05

## 2024-04-10 ENCOUNTER — HOSPITAL ENCOUNTER (EMERGENCY)
Facility: CLINIC | Age: 34
Discharge: HOME | End: 2024-04-10
Attending: EMERGENCY MEDICINE | Admitting: EMERGENCY MEDICINE
Payer: COMMERCIAL

## 2024-04-10 VITALS
WEIGHT: 180 LBS | SYSTOLIC BLOOD PRESSURE: 126 MMHG | RESPIRATION RATE: 20 BRPM | HEIGHT: 66 IN | BODY MASS INDEX: 28.93 KG/M2 | HEART RATE: 89 BPM | OXYGEN SATURATION: 95 % | DIASTOLIC BLOOD PRESSURE: 89 MMHG | TEMPERATURE: 98.6 F

## 2024-04-10 DIAGNOSIS — M54.50 LUMBAR BACK PAIN: ICD-10-CM

## 2024-04-10 LAB
ALBUMIN UR-MCNC: 10 MG/DL
ANION GAP SERPL CALCULATED.3IONS-SCNC: 12 MMOL/L (ref 7–15)
APPEARANCE UR: CLEAR
BACTERIA #/AREA URNS HPF: ABNORMAL /HPF
BASOPHILS # BLD AUTO: 0 10E3/UL (ref 0–0.2)
BASOPHILS NFR BLD AUTO: 0 %
BILIRUB UR QL STRIP: NEGATIVE
BUN SERPL-MCNC: 13.8 MG/DL (ref 6–20)
CALCIUM SERPL-MCNC: 9.1 MG/DL (ref 8.6–10)
CHLORIDE SERPL-SCNC: 104 MMOL/L (ref 98–107)
COLOR UR AUTO: ABNORMAL
CREAT SERPL-MCNC: 1.2 MG/DL (ref 0.51–0.95)
DEPRECATED HCO3 PLAS-SCNC: 22 MMOL/L (ref 22–29)
EGFRCR SERPLBLD CKD-EPI 2021: 62 ML/MIN/1.73M2
EOSINOPHIL # BLD AUTO: 0.1 10E3/UL (ref 0–0.7)
EOSINOPHIL NFR BLD AUTO: 1 %
ERYTHROCYTE [DISTWIDTH] IN BLOOD BY AUTOMATED COUNT: 12.5 % (ref 10–15)
GLUCOSE SERPL-MCNC: 83 MG/DL (ref 70–99)
GLUCOSE UR STRIP-MCNC: NEGATIVE MG/DL
HCG UR QL: NEGATIVE
HCT VFR BLD AUTO: 42.3 % (ref 35–47)
HGB BLD-MCNC: 14.2 G/DL (ref 11.7–15.7)
HGB UR QL STRIP: NEGATIVE
IMM GRANULOCYTES # BLD: 0 10E3/UL
IMM GRANULOCYTES NFR BLD: 0 %
INTERNAL QC OK POCT: NORMAL
KETONES UR STRIP-MCNC: NEGATIVE MG/DL
LEUKOCYTE ESTERASE UR QL STRIP: NEGATIVE
LYMPHOCYTES # BLD AUTO: 2 10E3/UL (ref 0.8–5.3)
LYMPHOCYTES NFR BLD AUTO: 26 %
MCH RBC QN AUTO: 31.6 PG (ref 26.5–33)
MCHC RBC AUTO-ENTMCNC: 33.6 G/DL (ref 31.5–36.5)
MCV RBC AUTO: 94 FL (ref 78–100)
MONOCYTES # BLD AUTO: 0.5 10E3/UL (ref 0–1.3)
MONOCYTES NFR BLD AUTO: 6 %
MUCOUS THREADS #/AREA URNS LPF: PRESENT /LPF
NEUTROPHILS # BLD AUTO: 5.1 10E3/UL (ref 1.6–8.3)
NEUTROPHILS NFR BLD AUTO: 67 %
NITRATE UR QL: NEGATIVE
NRBC # BLD AUTO: 0 10E3/UL
NRBC BLD AUTO-RTO: 0 /100
PH UR STRIP: 6.5 [PH] (ref 5–7)
PLATELET # BLD AUTO: 268 10E3/UL (ref 150–450)
POCT KIT EXPIRATION DATE: NORMAL
POCT KIT LOT NUMBER: NORMAL
POTASSIUM SERPL-SCNC: 3.9 MMOL/L (ref 3.4–5.3)
RBC # BLD AUTO: 4.5 10E6/UL (ref 3.8–5.2)
RBC URINE: 4 /HPF
SODIUM SERPL-SCNC: 138 MMOL/L (ref 135–145)
SP GR UR STRIP: 1.03 (ref 1–1.03)
SQUAMOUS EPITHELIAL: 1 /HPF
UROBILINOGEN UR STRIP-MCNC: NORMAL MG/DL
WBC # BLD AUTO: 7.7 10E3/UL (ref 4–11)
WBC URINE: 2 /HPF

## 2024-04-10 PROCEDURE — 250N000013 HC RX MED GY IP 250 OP 250 PS 637: Performed by: EMERGENCY MEDICINE

## 2024-04-10 PROCEDURE — 85025 COMPLETE CBC W/AUTO DIFF WBC: CPT | Performed by: EMERGENCY MEDICINE

## 2024-04-10 PROCEDURE — 81025 URINE PREGNANCY TEST: CPT | Performed by: EMERGENCY MEDICINE

## 2024-04-10 PROCEDURE — 82374 ASSAY BLOOD CARBON DIOXIDE: CPT | Performed by: EMERGENCY MEDICINE

## 2024-04-10 PROCEDURE — 36415 COLL VENOUS BLD VENIPUNCTURE: CPT | Performed by: EMERGENCY MEDICINE

## 2024-04-10 PROCEDURE — 81001 URINALYSIS AUTO W/SCOPE: CPT | Performed by: EMERGENCY MEDICINE

## 2024-04-10 PROCEDURE — 99283 EMERGENCY DEPT VISIT LOW MDM: CPT | Performed by: EMERGENCY MEDICINE

## 2024-04-10 PROCEDURE — 99284 EMERGENCY DEPT VISIT MOD MDM: CPT | Performed by: EMERGENCY MEDICINE

## 2024-04-10 RX ORDER — CYCLOBENZAPRINE HCL 10 MG
10 TABLET ORAL 3 TIMES DAILY
Status: DISCONTINUED | OUTPATIENT
Start: 2024-04-10 | End: 2024-04-10

## 2024-04-10 RX ORDER — HYDROCODONE BITARTRATE AND ACETAMINOPHEN 5; 325 MG/1; MG/1
1 TABLET ORAL ONCE
Status: COMPLETED | OUTPATIENT
Start: 2024-04-10 | End: 2024-04-10

## 2024-04-10 RX ORDER — CYCLOBENZAPRINE HCL 10 MG
10 TABLET ORAL ONCE
Status: COMPLETED | OUTPATIENT
Start: 2024-04-10 | End: 2024-04-10

## 2024-04-10 RX ORDER — CYCLOBENZAPRINE HCL 10 MG
10 TABLET ORAL 3 TIMES DAILY PRN
Qty: 20 TABLET | Refills: 0 | Status: SHIPPED | OUTPATIENT
Start: 2024-04-10 | End: 2024-04-17

## 2024-04-10 RX ORDER — HYDROCODONE BITARTRATE AND ACETAMINOPHEN 5; 325 MG/1; MG/1
1 TABLET ORAL EVERY 6 HOURS PRN
Qty: 10 TABLET | Refills: 0 | Status: SHIPPED | OUTPATIENT
Start: 2024-04-10 | End: 2024-04-13

## 2024-04-10 RX ADMIN — HYDROCODONE BITARTRATE AND ACETAMINOPHEN 1 TABLET: 5; 325 TABLET ORAL at 13:32

## 2024-04-10 RX ADMIN — CYCLOBENZAPRINE 10 MG: 10 TABLET, FILM COATED ORAL at 13:32

## 2024-04-10 ASSESSMENT — ACTIVITIES OF DAILY LIVING (ADL)
ADLS_ACUITY_SCORE: 33

## 2024-04-10 ASSESSMENT — COLUMBIA-SUICIDE SEVERITY RATING SCALE - C-SSRS
6. HAVE YOU EVER DONE ANYTHING, STARTED TO DO ANYTHING, OR PREPARED TO DO ANYTHING TO END YOUR LIFE?: NO
2. HAVE YOU ACTUALLY HAD ANY THOUGHTS OF KILLING YOURSELF IN THE PAST MONTH?: NO
1. IN THE PAST MONTH, HAVE YOU WISHED YOU WERE DEAD OR WISHED YOU COULD GO TO SLEEP AND NOT WAKE UP?: NO

## 2024-04-10 NOTE — ED PROVIDER NOTES
"ED Provider Note  [facility]      History     Chief Complaint   Patient presents with    Back Pain     Back pain x 3 days. Donated Kidney x 2 months. Spasms when standing.normal urination.     The history is provided by the patient and medical records.     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    Patient has been having lower right back pain that is close to her spine for the last 4 to 5 days.  Patient thought it was muscle pain related to her physical job, as patient works with animals at the [employer].  Yesterday, 4/9/2024, pain began to worsen.  Today, pain worsened further, and when patient moves from sitting to standing, patient will get \"slammed\"feeling like somebody has taken the muscle\" cranked it \", and cannot fully stand up.  Patient states it is super painful.  Patient states pain lessens the further away from her spine.  Patient has been unable to take NSAIDs due to recent left kidney donation, and is only taken Tylenol with last use at 9 AM this morning.  Patient states she is able to ambulate fine.  Patient denies urinary symptoms, chance of pregnancy, or vaginal discharge.  Patient is here for concerns of larger problems due to sole kidney.    Past Medical History  No past medical history on file.  No past surgical history on file.  ADDERALL XR 15 MG 24 hr capsule  escitalopram (LEXAPRO) 10 MG tablet  escitalopram (LEXAPRO) 5 MG tablet      No Known Allergies  Family History  No family history on file.  Social History   Social History     Tobacco Use    Smoking status: Never    Smokeless tobacco: Never   Vaping Use    Vaping status: Never Used   Substance Use Topics    Alcohol use: Not Currently    Drug use: Never         A medically appropriate review of systems was performed with pertinent positives and negatives " "noted in the HPI, and all other systems negative.    Physical Exam   BP: 126/89  Pulse: 89  Temp: 98.6  F (37  C)  Resp: 20  Height: 167.6 cm (5' 6\")  Weight: 81.6 kg (180 lb)  SpO2: 95 %  Physical Exam  Constitutional:       General: She is not in acute distress.     Appearance: She is not ill-appearing, toxic-appearing or diaphoretic.   Pulmonary:      Effort: Pulmonary effort is normal.   Abdominal:      General: There is no distension.      Tenderness: There is no abdominal tenderness.   Musculoskeletal:      Comments: Tenderness on the right side of her lateral spine.  No skin changes.  Worse with movement.  Normal movement of her legs.  Normal sensation.  Normal sensation longer in her legs.   Neurological:      General: No focal deficit present.      Mental Status: She is oriented to person, place, and time.   Psychiatric:         Mood and Affect: Mood normal.         Thought Content: Thought content normal.           ED Course, Procedures, & Data      Procedures       Results for orders placed or performed during the hospital encounter of 04/10/24   Basic metabolic panel     Status: Abnormal   Result Value Ref Range    Sodium 138 135 - 145 mmol/L    Potassium 3.9 3.4 - 5.3 mmol/L    Chloride 104 98 - 107 mmol/L    Carbon Dioxide (CO2) 22 22 - 29 mmol/L    Anion Gap 12 7 - 15 mmol/L    Urea Nitrogen 13.8 6.0 - 20.0 mg/dL    Creatinine 1.20 (H) 0.51 - 0.95 mg/dL    GFR Estimate 62 >60 mL/min/1.73m2    Calcium 9.1 8.6 - 10.0 mg/dL    Glucose 83 70 - 99 mg/dL   UA with Microscopic reflex to Culture     Status: Abnormal    Specimen: Urine, Midstream   Result Value Ref Range    Color Urine Light Yellow Colorless, Straw, Light Yellow, Yellow    Appearance Urine Clear Clear    Glucose Urine Negative Negative mg/dL    Bilirubin Urine Negative Negative    Ketones Urine Negative Negative mg/dL    Specific Gravity Urine 1.030 1.003 - 1.035    Blood Urine Negative Negative    pH Urine 6.5 5.0 - 7.0    Protein Albumin " Urine 10 (A) Negative mg/dL    Urobilinogen Urine Normal Normal, 2.0 mg/dL    Nitrite Urine Negative Negative    Leukocyte Esterase Urine Negative Negative    Bacteria Urine Few (A) None Seen /HPF    Mucus Urine Present (A) None Seen /LPF    RBC Urine 4 (H) <=2 /HPF    WBC Urine 2 <=5 /HPF    Squamous Epithelials Urine 1 <=1 /HPF    Narrative    Urine Culture not indicated   CBC with platelets and differential     Status: None   Result Value Ref Range    WBC Count 7.7 4.0 - 11.0 10e3/uL    RBC Count 4.50 3.80 - 5.20 10e6/uL    Hemoglobin 14.2 11.7 - 15.7 g/dL    Hematocrit 42.3 35.0 - 47.0 %    MCV 94 78 - 100 fL    MCH 31.6 26.5 - 33.0 pg    MCHC 33.6 31.5 - 36.5 g/dL    RDW 12.5 10.0 - 15.0 %    Platelet Count 268 150 - 450 10e3/uL    % Neutrophils 67 %    % Lymphocytes 26 %    % Monocytes 6 %    % Eosinophils 1 %    % Basophils 0 %    % Immature Granulocytes 0 %    NRBCs per 100 WBC 0 <1 /100    Absolute Neutrophils 5.1 1.6 - 8.3 10e3/uL    Absolute Lymphocytes 2.0 0.8 - 5.3 10e3/uL    Absolute Monocytes 0.5 0.0 - 1.3 10e3/uL    Absolute Eosinophils 0.1 0.0 - 0.7 10e3/uL    Absolute Basophils 0.0 0.0 - 0.2 10e3/uL    Absolute Immature Granulocytes 0.0 <=0.4 10e3/uL    Absolute NRBCs 0.0 10e3/uL   hCG qual urine POCT     Status: Normal   Result Value Ref Range    HCG Qual Urine Negative Negative    Internal QC Check POCT Valid Valid    POCT Kit Lot Number [id number]     POCT Kit Expiration Date 02/08/2025    CBC with platelets differential     Status: None    Narrative    The following orders were created for panel order CBC with platelets differential.  Procedure                               Abnormality         Status                     ---------                               -----------         ------                     CBC with platelets and d...[[id number]]                      Final result                 Please view results for these tests on the individual orders.     Medications   HYDROcodone-acetaminophen  (NORCO) 5-325 MG per tablet 1 tablet (1 tablet Oral $Given 4/10/24 1332)   cyclobenzaprine (FLEXERIL) tablet 10 mg (10 mg Oral $Given 4/10/24 1332)             No results found for any visits on 04/10/24.  Medications - No data to display  Labs Ordered and Resulted from Time of ED Arrival to Time of ED Departure - No data to display  No orders to display          Critical care was not performed.     Medical Decision Making  The patient's presentation was of low complexity (an acute and uncomplicated illness or injury).    The patient's evaluation involved:  review of external note(s) from 1 sources (chart summary from February when she donated kidney.)  review of 2 test result(s) ordered prior to this encounter (knee function and labs from recent discharge from the hospital.)  ordering and/or review of 2 test(s) in this encounter (UA, BMP)    The patient's management necessitated moderate risk (prescription drug management including medications given in the ED).    Assessment & Plan    Patient is a 33-year-old male who presents to the ER due to some right lower back pain.  Patient received 1 Norco and Flexeril and pain has resolved.  Patient is that she is feeling much better.  Patient's creatinine is her baseline and her UA is negative.  Initially ordered a lumbar x-ray but does not feel like it is needed.  Her pain seems very muscular in etiology.  Plan will be to discharge her home with a few pain meds and outpatient follow-up.    I have reviewed the nursing notes. I have reviewed the findings, diagnosis, plan and need for follow up with the patient.    New Prescriptions    No medications on file       Final diagnoses:   Lumbar back pain   IDennys, am serving as a trained medical scribe to document services personally performed by Alannah Schroeder MD, based to the provider's statements to me.     I, Alannah Schroeder MD, was physically present and have reviewed and verified the accuracy of  this note documented by Dennys Rizvi.       Alannah Schroeder MD  [facility] EMERGENCY DEPARTMENT  4/10/2024     Alannah Schroeder MD  04/10/24 2049

## 2024-04-10 NOTE — DISCHARGE INSTRUCTIONS
Your blood work is stable.     Your urine test is normal.     Please take the pain medications as needed.     Please make an appointment to follow up with Your Primary Care Provider in 3-5 days unless symptoms completely resolve.

## 2024-04-10 NOTE — ED TRIAGE NOTES
Back pain x 3 days. Donated Kidney x 2 months. Spasms when standing.normal urination.     Triage Assessment (Adult)       Row Name 04/10/24 1145          Triage Assessment    Airway WDL WDL        Respiratory WDL    Respiratory WDL WDL        Skin Circulation/Temperature WDL    Skin Circulation/Temperature WDL WDL        Cardiac WDL    Cardiac WDL WDL        Peripheral/Neurovascular WDL    Peripheral Neurovascular WDL WDL        Cognitive/Neuro/Behavioral WDL    Cognitive/Neuro/Behavioral WDL WDL

## 2024-09-22 ENCOUNTER — HEALTH MAINTENANCE LETTER (OUTPATIENT)
Age: 34
End: 2024-09-22